# Patient Record
Sex: MALE | Race: WHITE | NOT HISPANIC OR LATINO | Employment: FULL TIME | URBAN - METROPOLITAN AREA
[De-identification: names, ages, dates, MRNs, and addresses within clinical notes are randomized per-mention and may not be internally consistent; named-entity substitution may affect disease eponyms.]

---

## 2023-09-18 ENCOUNTER — OFFICE VISIT (OUTPATIENT)
Dept: PRIMARY CARE | Facility: CLINIC | Age: 33
End: 2023-09-18
Payer: COMMERCIAL

## 2023-09-18 VITALS
SYSTOLIC BLOOD PRESSURE: 118 MMHG | HEIGHT: 68 IN | TEMPERATURE: 98 F | OXYGEN SATURATION: 97 % | BODY MASS INDEX: 45.19 KG/M2 | WEIGHT: 298.2 LBS | HEART RATE: 81 BPM | RESPIRATION RATE: 16 BRPM | DIASTOLIC BLOOD PRESSURE: 80 MMHG

## 2023-09-18 DIAGNOSIS — Z00.00 ENCOUNTER FOR ROUTINE HISTORY AND PHYSICAL EXAM FOR MALE: Primary | ICD-10-CM

## 2023-09-18 DIAGNOSIS — F17.200 TOBACCO USE DISORDER: ICD-10-CM

## 2023-09-18 PROBLEM — S39.012A STRAIN OF LUMBAR REGION: Status: ACTIVE | Noted: 2023-09-18

## 2023-09-18 PROBLEM — M20.42 ACQUIRED HAMMER TOES OF BOTH FEET: Status: ACTIVE | Noted: 2023-09-18

## 2023-09-18 PROBLEM — M54.50 LUMBAR BACK PAIN: Status: ACTIVE | Noted: 2023-09-18

## 2023-09-18 PROBLEM — S46.911A RIGHT SHOULDER STRAIN: Status: ACTIVE | Noted: 2023-09-18

## 2023-09-18 PROBLEM — I83.811 VARICOSE VEINS OF RIGHT LOWER EXTREMITY WITH PAIN: Status: ACTIVE | Noted: 2023-09-18

## 2023-09-18 PROBLEM — M20.41 ACQUIRED HAMMER TOES OF BOTH FEET: Status: ACTIVE | Noted: 2023-09-18

## 2023-09-18 PROBLEM — M25.561 RIGHT KNEE PAIN: Status: ACTIVE | Noted: 2023-09-18

## 2023-09-18 PROBLEM — L73.9 FOLLICULITIS: Status: ACTIVE | Noted: 2023-09-18

## 2023-09-18 PROCEDURE — 99395 PREV VISIT EST AGE 18-39: CPT | Performed by: NURSE PRACTITIONER

## 2023-09-18 ASSESSMENT — ENCOUNTER SYMPTOMS
BACK PAIN: 1
DIZZINESS: 0
FEVER: 0
SINUS PRESSURE: 0
WEAKNESS: 0
POLYDIPSIA: 0
CONSTIPATION: 0
EYE REDNESS: 0
SHORTNESS OF BREATH: 0
ABDOMINAL PAIN: 0
NUMBNESS: 0
VOMITING: 0
CHILLS: 0
SORE THROAT: 0
COUGH: 0
HEADACHES: 0
FATIGUE: 0
HEMATURIA: 0
BRUISES/BLEEDS EASILY: 0
MYALGIAS: 1
WHEEZING: 0
POLYPHAGIA: 0
SLEEP DISTURBANCE: 0
DIFFICULTY URINATING: 0
DYSPHORIC MOOD: 0
DIARRHEA: 0
EYE PAIN: 0
PALPITATIONS: 0

## 2023-09-18 ASSESSMENT — PATIENT HEALTH QUESTIONNAIRE - PHQ9
2. FEELING DOWN, DEPRESSED OR HOPELESS: NOT AT ALL
1. LITTLE INTEREST OR PLEASURE IN DOING THINGS: NOT AT ALL
SUM OF ALL RESPONSES TO PHQ9 QUESTIONS 1 AND 2: 0

## 2023-09-18 NOTE — PROGRESS NOTES
"Subjective   Patient ID: El Davis is a 33 y.o. male who presents for Employment Physical.    Pt [presents in office for a physical exam for fostering. Needs paper work filled out.   Denies any concerns/complaints today. He is a patient of Dr. Dailey.   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     Review of Systems   Constitutional:  Negative for chills, fatigue and fever.   HENT:  Negative for congestion, ear pain, sinus pressure and sore throat.    Eyes:  Negative for pain, redness and visual disturbance.   Respiratory:  Negative for cough, shortness of breath and wheezing.    Cardiovascular:  Negative for chest pain and palpitations.   Gastrointestinal:  Negative for abdominal pain, constipation, diarrhea and vomiting.   Endocrine: Negative for polydipsia, polyphagia and polyuria.   Genitourinary:  Negative for difficulty urinating and hematuria.   Musculoskeletal:  Positive for back pain and myalgias.   Allergic/Immunologic: Negative for environmental allergies and food allergies.   Neurological:  Negative for dizziness, weakness, numbness and headaches.   Hematological:  Does not bruise/bleed easily.   Psychiatric/Behavioral:  Negative for behavioral problems, dysphoric mood, self-injury, sleep disturbance and suicidal ideas.        Objective   /80   Pulse 81   Temp 36.7 °C (98 °F) (Temporal)   Resp 16   Ht 1.727 m (5' 8\")   Wt 135 kg (298 lb 3.2 oz)   SpO2 97%   BMI 45.34 kg/m²     Physical Exam  Constitutional:       General: He is not in acute distress.     Appearance: He is obese.   HENT:      Right Ear: Tympanic membrane normal.      Left Ear: Tympanic membrane normal.      Nose: Nose normal.      Mouth/Throat:      Mouth: Mucous membranes are moist.      Pharynx: Oropharynx is clear.   Eyes:      Conjunctiva/sclera: Conjunctivae normal.      Pupils: Pupils are equal, round, and reactive to light.   Cardiovascular:      Rate and Rhythm: " Normal rate and regular rhythm.      Heart sounds: Normal heart sounds.   Pulmonary:      Effort: Pulmonary effort is normal.      Breath sounds: Normal breath sounds.   Abdominal:      General: Bowel sounds are normal.      Palpations: Abdomen is soft.      Tenderness: There is no abdominal tenderness.   Musculoskeletal:      Comments: Varicose veins noted in right lower extremity.   Lymphadenopathy:      Cervical: No cervical adenopathy.   Skin:     General: Skin is warm and dry.   Neurological:      Mental Status: He is alert and oriented to person, place, and time.   Psychiatric:         Mood and Affect: Mood normal.         Behavior: Behavior normal.         Assessment/Plan   Problem List Items Addressed This Visit    None  Visit Diagnoses       Encounter for routine history and physical exam for male    -  Primary    Tobacco use disorder            Forms filled out, see scanned document in chart.   Encouraged smoking cessation, regular exercise and healthy diet.   Follow up as needed with any additional concerns.

## 2023-12-11 DIAGNOSIS — U07.1 SARS-COV-2 POSITIVE: Primary | ICD-10-CM

## 2023-12-11 RX ORDER — LORAZEPAM 2 MG/1
TABLET ORAL
COMMUNITY
Start: 2023-05-17

## 2023-12-11 RX ORDER — NIRMATRELVIR AND RITONAVIR 300-100 MG
3 KIT ORAL 2 TIMES DAILY
Qty: 5 DOSE PACK | Refills: 0 | Status: SHIPPED | OUTPATIENT
Start: 2023-12-11 | End: 2023-12-16

## 2023-12-11 RX ORDER — MELOXICAM 15 MG/1
1 TABLET ORAL DAILY PRN
COMMUNITY
Start: 2015-12-22

## 2023-12-11 NOTE — TELEPHONE ENCOUNTER
Rx pended to [facility] as directed by patient.    Note: Unsure if [facility] supplies Paxlovid         LOV: 01.23.2023  NOV: N/A

## 2023-12-11 NOTE — TELEPHONE ENCOUNTER
Yandy and her  El both tested positive for covid today their symptoms are deep cough, stuffy and runny nose no chills, no fever no fatigue and were wondering if they could have paxlovid and whatever else medications would help sent to [facility],  please advise.

## 2025-02-05 ENCOUNTER — OFFICE VISIT (OUTPATIENT)
Dept: PRIMARY CARE | Facility: CLINIC | Age: 35
End: 2025-02-05
Payer: COMMERCIAL

## 2025-02-05 ENCOUNTER — HOSPITAL ENCOUNTER (OUTPATIENT)
Dept: RADIOLOGY | Facility: CLINIC | Age: 35
Discharge: HOME | End: 2025-02-05
Payer: COMMERCIAL

## 2025-02-05 DIAGNOSIS — S89.91XA INJURY OF RIGHT KNEE, INITIAL ENCOUNTER: ICD-10-CM

## 2025-02-05 DIAGNOSIS — E66.01 CLASS 3 SEVERE OBESITY WITH BODY MASS INDEX (BMI) OF 45.0 TO 49.9 IN ADULT, UNSPECIFIED OBESITY TYPE, UNSPECIFIED WHETHER SERIOUS COMORBIDITY PRESENT: ICD-10-CM

## 2025-02-05 DIAGNOSIS — M25.561 ACUTE PAIN OF RIGHT KNEE: Primary | ICD-10-CM

## 2025-02-05 DIAGNOSIS — E66.813 CLASS 3 SEVERE OBESITY WITH BODY MASS INDEX (BMI) OF 45.0 TO 49.9 IN ADULT, UNSPECIFIED OBESITY TYPE, UNSPECIFIED WHETHER SERIOUS COMORBIDITY PRESENT: ICD-10-CM

## 2025-02-05 DIAGNOSIS — M25.561 ACUTE PAIN OF RIGHT KNEE: ICD-10-CM

## 2025-02-05 PROCEDURE — 99212 OFFICE O/P EST SF 10 MIN: CPT | Performed by: NURSE PRACTITIONER

## 2025-02-05 PROCEDURE — 73564 X-RAY EXAM KNEE 4 OR MORE: CPT | Mod: RT

## 2025-02-05 RX ORDER — DICLOFENAC SODIUM 75 MG/1
75 TABLET, DELAYED RELEASE ORAL 2 TIMES DAILY PRN
Qty: 60 TABLET | Refills: 0 | Status: SHIPPED | OUTPATIENT
Start: 2025-02-05 | End: 2025-04-06

## 2025-02-05 ASSESSMENT — PATIENT HEALTH QUESTIONNAIRE - PHQ9
1. LITTLE INTEREST OR PLEASURE IN DOING THINGS: NOT AT ALL
SUM OF ALL RESPONSES TO PHQ9 QUESTIONS 1 AND 2: 0
2. FEELING DOWN, DEPRESSED OR HOPELESS: NOT AT ALL

## 2025-02-05 ASSESSMENT — PAIN SCALES - GENERAL: PAINLEVEL_OUTOF10: 4

## 2025-02-05 NOTE — PROGRESS NOTES
"Subjective   Patient ID: El Davis is a 34 y.o. male who presents for Knee Pain.      Symptoms: right knee pain,  discomfort when walking, unable to bear weight,  standing and sitting motions cause discomfort. Pain scale  5/10 motion.  Length of symptoms: yesterday  OTC:  aleve with mild  help  Related information:    HPI     Review of Systems    Objective   /80   Pulse 86   Temp 36.7 °C (98.1 °F)   Resp 16   Ht 1.702 m (5' 7\")   Wt 135 kg (296 lb 9.6 oz)   SpO2 98%   BMI 46.45 kg/m²     Physical Exam    Assessment/Plan          "

## 2025-02-05 NOTE — PROGRESS NOTES
"Subjective   Patient ID: El Davis is a 34 y.o. male who is with chief complaint of right knee pain.     HPI   Patient is a 34 y.o. male who CONSULTED AT [facility] today. Patient is with complaint of pain on the right knee. He states that yesterday he accidentally twisted his right knee while playing with his kids. Patient states the pain is on the front upper and lower sides of his right knee cap, achy in character, 3-5/10, intermittent, aggravated by movement, and non radiating. he denies fever, chills, paresthesia, paralysis, nor change in the color of the skin or nails of involved extremity. he states he tried OTC medications which afforded only slight relief of symptoms.    Review of Systems  General: no weight loss, generally healthy, no fatigue  Head:  no headaches / sinus pain, no vertigo, no injury  Eyes: no diplopia, no tearing, no pain,   Ears: no change in hearing, no tinnitus, no bleeding, no vertigo  Mouth:  no dental difficulties, no gingival bleeding, no sore throat, no loss of sense of taste  Nose: no congestion, no  discharge, no bleeding, no obstruction, no loss of sense of smell  Neck: no stiffness, no pain, no tenderness, no masses, no bruit  Pulmonary: no dyspnea, no wheezing, no hemoptysis, no cough  Cardiovascular: no chest pain, no palpitations, no syncope, no orthopnea  Gastrointestinal: no change in appetite, no dysphagia, no abdominal pains, no diarrhea, no emesis, no melena  Genito Urinary: no dysuria, no urinary urgency, no nocturia, no incontinence, no change in nature of urine  Musculoskeletal: (+) right knee pain, (+) R knee limitation of range of motion due to pain, no paresthesia, no numbness  Constitutional: no fever, no chills, no night sweats    Objective   /80   Pulse 86   Temp 36.7 °C (98.1 °F)   Resp 16   Ht 1.702 m (5' 7\")   Wt 135 kg (296 lb 9.6 oz)   SpO2 98%   BMI 46.45 kg/m²     Physical Exam  General: ambulatory, in no acute " distress  Head: normocephalic, no lesions  Eyes: pink palpebral conjunctiva, anicteric sclerae, PERRLA, EOM's full  Musculoskeletal /extremities: no paralysis, no deformity, full and equal peripheral pulses, no edema, (+) tenderness on the anterior knee, with no ballotment, negative Mc Gipson, negative Lachman, negative Anterior Drawer, negative Posterior Drawer, negative Valgus stress, negative Varum stress, (+) limited flexion/ extension of right leg due to right knee pain,     Assessment/Plan   Problem List Items Addressed This Visit             ICD-10-CM    Right knee pain - Primary M25.561    Relevant Medications    diclofenac (Voltaren) 75 mg EC tablet     Other Visit Diagnoses         Codes    Injury of right knee, initial encounter     S89.91XA    Relevant Medications    diclofenac (Voltaren) 75 mg EC tablet    BMI 45.0-49.9, adult (Multi)     Z68.42    Class 3 severe obesity with body mass index (BMI) of 45.0 to 49.9 in adult, unspecified obesity type, unspecified whether serious comorbidity present     E66.813, E66.01, Z68.42        patient underwent xray of right knee today  initial (unofficial / non radiologist) reading discussed with patient  will call patient with official reading from the radiologist  patient verbalized understanding    DISCHARGE SUMMARY:   Patient was seen and examined. Diagnosis, treatment, treatment options, and possible complications of today's illness discussed and explained to patient. Patient to take medication/s associated with this visit. Patient may also take OTC analgesic/ antipyretic if needed for pain/fever. Components of RICE therapy explained to patient. Advised to follow up once result of x ray are available. Advised to come back if with worsening or persistent symptoms. Patient verbalized understanding of plan of care.    Patient to come back in 7 - 10 days if needed for worsening symptoms.

## 2025-02-07 ENCOUNTER — DOCUMENTATION (OUTPATIENT)
Dept: PRIMARY CARE | Facility: CLINIC | Age: 35
End: 2025-02-07
Payer: COMMERCIAL

## 2025-02-07 NOTE — PROGRESS NOTES
I called and talked to patient. We discussed knee xray results. Patient states symptoms are getting better.

## 2025-02-09 VITALS
BODY MASS INDEX: 46.55 KG/M2 | DIASTOLIC BLOOD PRESSURE: 80 MMHG | RESPIRATION RATE: 16 BRPM | HEIGHT: 67 IN | HEART RATE: 86 BPM | WEIGHT: 296.6 LBS | OXYGEN SATURATION: 98 % | SYSTOLIC BLOOD PRESSURE: 108 MMHG | TEMPERATURE: 98.1 F

## 2025-02-09 ASSESSMENT — ENCOUNTER SYMPTOMS
LOSS OF SENSATION IN FEET: 0
DEPRESSION: 0
OCCASIONAL FEELINGS OF UNSTEADINESS: 0

## 2025-02-09 NOTE — PATIENT INSTRUCTIONS
DISCHARGE SUMMARY:   Patient was seen and examined. Diagnosis, treatment, treatment options, and possible complications of today's illness discussed and explained to patient. Patient to take medication/s associated with this visit. Patient may also take OTC analgesic/ antipyretic if needed for pain/fever. Components of RICE therapy explained to patient. Advised to follow up once result of x ray are available. Advised to come back if with worsening or persistent symptoms. Patient verbalized understanding of plan of care.    Patient to come back in 7 - 10 days if needed for worsening symptoms.